# Patient Record
Sex: FEMALE | Race: WHITE | URBAN - METROPOLITAN AREA
[De-identification: names, ages, dates, MRNs, and addresses within clinical notes are randomized per-mention and may not be internally consistent; named-entity substitution may affect disease eponyms.]

---

## 2017-02-13 ENCOUNTER — RESULT FOLLOW UP (OUTPATIENT)
Dept: FAMILY MEDICINE | Facility: CLINIC | Age: 37
End: 2017-02-13

## 2017-02-13 ENCOUNTER — OFFICE VISIT (OUTPATIENT)
Dept: FAMILY MEDICINE | Facility: CLINIC | Age: 37
End: 2017-02-13
Payer: COMMERCIAL

## 2017-02-13 VITALS
WEIGHT: 235.4 LBS | HEIGHT: 69 IN | BODY MASS INDEX: 34.87 KG/M2 | SYSTOLIC BLOOD PRESSURE: 130 MMHG | DIASTOLIC BLOOD PRESSURE: 88 MMHG | TEMPERATURE: 97.7 F | HEART RATE: 88 BPM

## 2017-02-13 DIAGNOSIS — Z00.00 ROUTINE GENERAL MEDICAL EXAMINATION AT A HEALTH CARE FACILITY: Primary | ICD-10-CM

## 2017-02-13 DIAGNOSIS — Z13.1 SCREENING FOR DIABETES MELLITUS: ICD-10-CM

## 2017-02-13 DIAGNOSIS — Z13.29 SCREENING FOR THYROID DISORDER: ICD-10-CM

## 2017-02-13 DIAGNOSIS — Z13.220 SCREENING FOR LIPOID DISORDERS: ICD-10-CM

## 2017-02-13 DIAGNOSIS — R87.810 ASCUS WITH POSITIVE HIGH RISK HPV CERVICAL: ICD-10-CM

## 2017-02-13 DIAGNOSIS — Z11.51 SCREENING FOR HUMAN PAPILLOMAVIRUS: ICD-10-CM

## 2017-02-13 DIAGNOSIS — Z12.4 SCREENING FOR CERVICAL CANCER: ICD-10-CM

## 2017-02-13 DIAGNOSIS — R87.610 ASCUS WITH POSITIVE HIGH RISK HPV CERVICAL: ICD-10-CM

## 2017-02-13 PROCEDURE — 87624 HPV HI-RISK TYP POOLED RSLT: CPT | Performed by: NURSE PRACTITIONER

## 2017-02-13 PROCEDURE — 99395 PREV VISIT EST AGE 18-39: CPT | Performed by: NURSE PRACTITIONER

## 2017-02-13 PROCEDURE — G0124 SCREEN C/V THIN LAYER BY MD: HCPCS | Performed by: NURSE PRACTITIONER

## 2017-02-13 PROCEDURE — G0145 SCR C/V CYTO,THINLAYER,RESCR: HCPCS | Performed by: NURSE PRACTITIONER

## 2017-02-13 NOTE — PATIENT INSTRUCTIONS
Nice to meet you Yue.  Please call [phone] to schedule fasting labs. I will let you know your PAP/ lab results. Follow up if you have any health care questions or concerns. Good luck with your paramedic program.      Preventive Health Recommendations  Female Ages 26 - 39   Yearly exam:   See your health care provider every year in order to    Review health changes.     Discuss preventive care.      Review your medicines if you your doctor has prescribed any.    Until age 30: Get a Pap test every three years (more often if you have had an abnormal result).    After age 30: Talk to your doctor about whether you should have a Pap test every 3 years or have a Pap test with HPV screening every 5 years.   You do not need a Pap test if your uterus was removed (hysterectomy) and you have not had cancer.  You should be tested each year for STDs (sexually transmitted diseases), if you're at risk.   Talk to your provider about how often to have your cholesterol checked.  If you are at risk for diabetes, you should have a diabetes test (fasting glucose).  Shots: Get a flu shot each year. Get a tetanus shot every 10 years.   Nutrition:     Eat at least 5 servings of fruits and vegetables each day.    Eat whole-grain bread, whole-wheat pasta and brown rice instead of white grains and rice.    Talk to your provider about Calcium and Vitamin D.     Lifestyle    Exercise at least 150 minutes a week (30 minutes a day, 5 days of the week). This will help you control your weight and prevent disease.    Limit alcohol to one drink per day.    No smoking.     Wear sunscreen to prevent skin cancer.    See your dentist every six months for an exam and cleaning.

## 2017-02-13 NOTE — PROGRESS NOTES
SUBJECTIVE:          HPI     SUBJECTIVE:     CC: Yue Kat is an 36 year old woman who presents for preventive health visit.     Healthy Habits:    Do you get at least three servings of calcium containing foods daily (dairy, green leafy vegetables, etc.)? yes    Amount of exercise or daily activities, outside of work: 3 day(s) per week    Problems taking medications regularly No    Medication side effects: No    Have you had an eye exam in the past two years? no    Do you see a dentist twice per year? no  Do you have sleep apnea, excessive snoring or daytime drowsiness?no      Today's PHQ-2 Score:   PHQ-2 ( 1999 Pf[employer]) 2/13/2017   Q1: Little interest or pleasure in doing things 0   Q2: Feeling down, depressed or hopeless 0   PHQ-2 Score 0       Abuse: Current or Past(Physical, Sexual or Emotional)- No  Do you feel safe in your environment - YES    Social History   Substance Use Topics     Smoking status: Not on file     Smokeless tobacco: Not on file     Alcohol use Not on file     The patient does not drink >3 drinks per day nor >7 drinks per week.    No results for input(s): CHOL, HDL, LDL, TRIG, CHOLHDLRATIO, NHDL in the last 93865 hours.    Reviewed orders with patient.  Reviewed health maintenance and updated orders accordingly - Yes    Mammo Decision Support:  Mammogram not appropriate for this patient based on age.    Pertinent mammograms are reviewed under the imaging tab.  History of abnormal Pap smear: NO - age 30- 65 PAP every 3 years recommended  All Histories reviewed and updated in Epic.  No past medical history on file.   No past surgical history on file.  Obstetric History     No data available          ROS:  C: NEGATIVE for fever, chills, change in weight  I: NEGATIVE for worrisome rashes, moles or lesions  E: NEGATIVE for vision changes or irritation  ENT: NEGATIVE for ear, mouth and throat problems  R: NEGATIVE for significant cough or SOB  B: NEGATIVE for masses, tenderness or  "discharge  CV: NEGATIVE for chest pain, palpitations or peripheral edema  GI: NEGATIVE for nausea, abdominal pain, heartburn, or change in bowel habits  : NEGATIVE for unusual urinary or vaginal symptoms. Periods are regular.  M: NEGATIVE for significant arthralgias or myalgia  N: NEGATIVE for weakness, dizziness or paresthesias  E: NEGATIVE for temperature intolerance, skin/hair changes  H: NEGATIVE for bleeding problems  P: NEGATIVE for changes in mood or affect    Problem list, Medication list, Allergies, and Medical/Social/Surgical histories reviewed in [facility] and updated as appropriate.  Labs reviewed in EPIC  BP Readings from Last 3 Encounters:   02/13/17 130/88    Wt Readings from Last 3 Encounters:   02/13/17 235 lb 6.4 oz (106.8 kg)                  There is no problem list on file for this patient.    No past surgical history on file.    Social History   Substance Use Topics     Smoking status: Not on file     Smokeless tobacco: Not on file     Alcohol use Not on file     No family history on file.      No current outpatient prescriptions on file.     Allergies   Allergen Reactions     Penicillins      OBJECTIVE:     /88  Pulse 88  Temp 97.7  F (36.5  C) (Oral)  Ht 5' 9\" (1.753 m)  Wt 235 lb 6.4 oz (106.8 kg)  BMI 34.76 kg/m2  EXAM:  GENERAL: healthy, alert and no distress  EYES: Eyes grossly normal to inspection, PERRL and conjunctivae and sclerae normal  HENT: ear canals and TM's normal, nose and mouth without ulcers or lesions  NECK: no adenopathy, no asymmetry, masses, or scars and thyroid normal to palpation  RESP: lungs clear to auscultation - no rales, rhonchi or wheezes  BREAST: normal without masses, tenderness or nipple discharge and no palpable axillary masses or adenopathy  CV: regular rate and rhythm, normal S1 S2, no S3 or S4, no murmur, click or rub, no peripheral edema and peripheral pulses strong  ABDOMEN: soft, nontender, no hepatosplenomegaly, no masses and bowel sounds normal   " "(female): normal female external genitalia, normal urethral meatus, vaginal mucosa pink, moist, well rugated, and normal cervix/adnexa/uterus without masses or discharge  RECTAL: normal sphincter tone  MS: no gross musculoskeletal defects noted, no edema  SKIN: no suspicious lesions or rashes  NEURO: Normal strength and tone, mentation intact and speech normal  PSYCH: mentation appears normal, affect normal/bright  LYMPH: no cervical, supraclavicular, axillary, or inguinal adenopathy    ASSESSMENT/PLAN:         ICD-10-CM    1. Routine general medical examination at a health care facility Z00.00    2. Screening for lipoid disorders Z13.220 Lipid Profile with reflex to direct LDL   3. Screening for diabetes mellitus Z13.1 Glucose   4. Screening for cervical cancer Z12.4 Pap imaged thin layer screen with HPV - recommended age 30 - 65 years (select HPV order below)   5. Screening for human papillomavirus Z11.51 HPV High Risk Types DNA Cervical   6. Screening for thyroid disorder Z13.29 TSH with free T4 reflex       COUNSELING:   Reviewed preventive health counseling, as reflected in patient instructions   Nice to meet you Yue.  Please call [phone] to schedule fasting labs. I will let you know your PAP/ lab results. Follow up if you have any health care questions or concerns. Good luck with your paramedic program.    BP Screening:   Last 3 BP Readings:    BP Readings from Last 3 Encounters:   02/13/17 130/88       The following was recommended to the patient:  Re-screen BP within a year and recommended lifestyle modifications     has no tobacco history on file.    Estimated body mass index is 34.76 kg/(m^2) as calculated from the following:    Height as of this encounter: 5' 9\" (1.753 m).    Weight as of this encounter: 235 lb 6.4 oz (106.8 kg).   Weight management plan: Discussed healthy diet and exercise guidelines and patient will follow up in 12 months in clinic to re-evaluate.    Counseling Resources:  ATP " IV Guidelines  Pooled Cohorts Equation Calculator  Breast Cancer Risk Calculator  FRAX Risk Assessment  ICSI Preventive Guidelines  Dietary Guidelines for Americans, 2010  USDA's MyPlate  ASA Prophylaxis  Lung CA Screening    MARY Townsend  [facility]

## 2017-02-13 NOTE — NURSING NOTE
"Chief Complaint   Patient presents with     Physical       Initial BP (!) 132/91  Pulse 88  Temp 97.7  F (36.5  C) (Oral)  Ht 5' 9\" (1.753 m)  Wt 235 lb 6.4 oz (106.8 kg)  BMI 34.76 kg/m2 Estimated body mass index is 34.76 kg/(m^2) as calculated from the following:    Height as of this encounter: 5' 9\" (1.753 m).    Weight as of this encounter: 235 lb 6.4 oz (106.8 kg).  Medication Reconciliation: complete     Piedad Luong CMA      "

## 2017-02-13 NOTE — LETTER
September 26, 2017      Yue M North  [address] [facility] [address]    Dear ,      At [facility], your health and wellness is our primary concern. That is why we are following up on a LEEP from 4/10/17, which was reported as HSIL. Your provider had recommended that you have a Pap smear completed by 10/10/17. Our records do not show that this has been done.      It is important to complete the follow up that your provider has suggested for you to ensure that there are no worsening changes which may, over time, develop into cancer.      Please contact our office at  [phone] to schedule an appointment for a Pap smear at your earliest convenience. If you have questions or concerns, please call the clinic and we will be happy to assist you.    If you have completed the tests outside of [facility], please have the results forwarded to our office. We will update the chart for your primary Physician to review before your next annual physical.     Thank you for choosing [facility]!    Sincerely,      Yumiko Lopes NP/blake

## 2017-02-13 NOTE — MR AVS SNAPSHOT
After Visit Summary   2/13/2017    Yue Kat    MRN: [id number]           Patient Information     Date Of Birth          1980        Visit Information        Provider Department      2/13/2017 8:20 AM Yumiko Lopes NP [facility]        Today's Diagnoses     Screening for lipoid disorders    -  1    Screening for diabetes mellitus        Screening for cervical cancer        Screening for human papillomavirus        Screening for thyroid disorder          Care Instructions    Nice to meet you Yue.  Please call [phone] to schedule fasting labs. I will let you know your PAP/ lab results. Follow up if you have any health care questions or concerns. Good luck with your paramedic program.      Preventive Health Recommendations  Female Ages 26 - 39  Yearly exam:   See your health care provider every year in order to    Review health changes.     Discuss preventive care.      Review your medicines if you your doctor has prescribed any.    Until age 30: Get a Pap test every three years (more often if you have had an abnormal result).    After age 30: Talk to your doctor about whether you should have a Pap test every 3 years or have a Pap test with HPV screening every 5 years.   You do not need a Pap test if your uterus was removed (hysterectomy) and you have not had cancer.  You should be tested each year for STDs (sexually transmitted diseases), if you're at risk.   Talk to your provider about how often to have your cholesterol checked.  If you are at risk for diabetes, you should have a diabetes test (fasting glucose).  Shots: Get a flu shot each year. Get a tetanus shot every 10 years.   Nutrition:     Eat at least 5 servings of fruits and vegetables each day.    Eat whole-grain bread, whole-wheat pasta and brown rice instead of white grains and rice.    Talk to your provider about Calcium and Vitamin D.     Lifestyle    Exercise at least 150 minutes a week (30 minutes a  "day, 5 days of the week). This will help you control your weight and prevent disease.    Limit alcohol to one drink per day.    No smoking.     Wear sunscreen to prevent skin cancer.    See your dentist every six months for an exam and cleaning.          Follow-ups after your visit        Future tests that were ordered for you today     Open Future Orders        Priority Expected Expires Ordered    TSH with free T4 reflex Routine  2018    Lipid Profile with reflex to direct LDL Routine  2018    Glucose Routine  2018            Who to contact     Normal or non-critical lab and imaging results will be communicated to you by E[employer] D[employer]et[employer], letter or phone within 4 business days after the clinic has received the results. If you do not hear from us within 7 days, please contact the clinic through StoryPresst or phone. If you have a critical or abnormal lab result, we will notify you by phone as soon as possible.  Submit refill requests through [employer] or call your pharmacy and they will forward the refill request to us. Please allow 3 business days for your refill to be completed.          If you need to speak with a  for additional information , please call: [phone]             Additional Information About Your Visit        Kantox Information     Kantox lets you send messages to your doctor, view your test results, renew your prescriptions, schedule appointments and more. To sign up, go to [email or web address] . Click on \"Log in\" on the left side of the screen, which will take you to the Welcome page. Then click on \"Sign up Now\" on the right side of the page.     You will be asked to enter the access code listed below, as well as some personal information. Please follow the directions to create your username and password.     Your access code is: [email or web address]  Expires: 2017  8:46 AM     Your access code will  in 90 days. If you need help or a new " "code, please call your [facility] clinic or [phone].        Care EveryWhere ID     This is your Care EveryWhere ID. This could be used by other organizations to access your [facility] medical records  [id number]        Your Vitals Were     Pulse Temperature Height BMI (Body Mass Index)          88 97.7  F (36.5  C) (Oral) 5' 9\" (1.753 m) 34.76 kg/m2         Blood Pressure from Last 3 Encounters:   02/13/17 (!) 132/91    Weight from Last 3 Encounters:   02/13/17 235 lb 6.4 oz (106.8 kg)              We Performed the Following     HPV High Risk Types DNA Cervical     Pap imaged thin layer screen with HPV - recommended age 30 - 65 years (select HPV order below)        Primary Care Provider Office Phone #    [facility] [phone]       No address on file        Thank you!     Thank you for choosing [facility]  for your care. Our goal is always to provide you with excellent care. Hearing back from our patients is one way we can continue to improve our services. Please take a few minutes to complete the written survey that you may receive in the mail after your visit with us. Thank you!             Your Updated Medication List - Protect others around you: Learn how to safely use, store and throw away your medicines at www.disposemymeds.org.      Notice  As of 2/13/2017  8:46 AM    You have not been prescribed any medications.      "

## 2017-02-16 LAB
COPATH REPORT: ABNORMAL
PAP: ABNORMAL

## 2017-02-17 LAB
FINAL DIAGNOSIS: ABNORMAL
HPV HR 12 DNA CVX QL NAA+PROBE: POSITIVE
HPV16 DNA SPEC QL NAA+PROBE: NEGATIVE
HPV18 DNA SPEC QL NAA+PROBE: NEGATIVE
SPECIMEN DESCRIPTION: ABNORMAL

## 2017-02-20 ENCOUNTER — MYC MEDICAL ADVICE (OUTPATIENT)
Dept: FAMILY MEDICINE | Facility: OTHER | Age: 37
End: 2017-02-20

## 2017-02-20 DIAGNOSIS — R87.610 ATYPICAL SQUAMOUS CELLS OF UNDETERMINED SIGNIFICANCE ON CYTOLOGIC SMEAR OF CERVIX (ASC-US): Primary | ICD-10-CM

## 2017-02-21 PROBLEM — R87.610 ASCUS WITH POSITIVE HIGH RISK HPV CERVICAL: Status: ACTIVE | Noted: 2017-02-13

## 2017-02-21 PROBLEM — R87.810 ASCUS WITH POSITIVE HIGH RISK HPV CERVICAL: Status: ACTIVE | Noted: 2017-02-13

## 2017-02-21 NOTE — PROGRESS NOTES
2/13/17: ASCUS Pap, + HR HPV (Neg 16/18). Plan colp  2/22/17: Message left to return call. (carlos)  2/27/17: [place] Bx & ECC - HSIL. Plan consult with Dr. Agbeh for treatment options.   3/27/17: Consult with GYN. Plan LEEP  4/10/17: LEEP - focal HSIL. Plan pap in 6 months.   9/26/17 My Chart pap reminder message sent (rlm)  10/23/17 Pap reminder telephone message left (rlm)  11/15/17 Consider lost to f/u, routed to provider (rlm)  11/27/17 Patient is considered lost to f/u, acknowledged by provider.  End Tracking (rlm)

## 2017-02-22 ENCOUNTER — TELEPHONE (OUTPATIENT)
Dept: OBGYN | Facility: CLINIC | Age: 37
End: 2017-02-22

## 2017-02-22 NOTE — TELEPHONE ENCOUNTER
Spoke with pt and gave her the below information. Instructed her to make an appt with ob/gyn. She would like to see Dr. Agbeh here at [place], gave her number to call to schedule appt. Pt verbalized understanding and appreciation for the call.   Michelle Carlson RN

## 2017-02-22 NOTE — TELEPHONE ENCOUNTER
Please call pt:      I apologize for the delay.  I have been out of the office ill, I am out today; additionally, we have PAP nurses who address PAP results. Your PAP results are abnormal. The results of the PAP show: Atypical squamous cells with HPV present: not type 16 or 18. Because of this, you will need to be seen by OB/GYN, for probable colposcopy. Order for OB/GYN has been placed. Yumiko Lopes, APRN, FNP-BC

## 2017-02-27 ENCOUNTER — OFFICE VISIT (OUTPATIENT)
Dept: OBGYN | Facility: CLINIC | Age: 37
End: 2017-02-27
Payer: COMMERCIAL

## 2017-02-27 VITALS
WEIGHT: 230 LBS | TEMPERATURE: 98.1 F | BODY MASS INDEX: 33.97 KG/M2 | HEART RATE: 70 BPM | OXYGEN SATURATION: 97 % | DIASTOLIC BLOOD PRESSURE: 86 MMHG | SYSTOLIC BLOOD PRESSURE: 138 MMHG

## 2017-02-27 DIAGNOSIS — R87.810 ASCUS WITH POSITIVE HIGH RISK HPV CERVICAL: Primary | ICD-10-CM

## 2017-02-27 DIAGNOSIS — R87.610 ASCUS WITH POSITIVE HIGH RISK HPV CERVICAL: Primary | ICD-10-CM

## 2017-02-27 PROCEDURE — 57454 BX/CURETT OF CERVIX W/SCOPE: CPT | Performed by: OBSTETRICS & GYNECOLOGY

## 2017-02-27 PROCEDURE — 88341 IMHCHEM/IMCYTCHM EA ADD ANTB: CPT | Performed by: OBSTETRICS & GYNECOLOGY

## 2017-02-27 PROCEDURE — 88342 IMHCHEM/IMCYTCHM 1ST ANTB: CPT | Performed by: OBSTETRICS & GYNECOLOGY

## 2017-02-27 PROCEDURE — 88305 TISSUE EXAM BY PATHOLOGIST: CPT | Performed by: OBSTETRICS & GYNECOLOGY

## 2017-02-27 PROCEDURE — 99242 OFF/OP CONSLTJ NEW/EST SF 20: CPT | Mod: 25 | Performed by: OBSTETRICS & GYNECOLOGY

## 2017-02-27 NOTE — MR AVS SNAPSHOT
After Visit Summary   2/27/2017    Yue Kat    MRN: [id number]           Patient Information     Date Of Birth          1980        Visit Information        Provider Department      2/27/2017 1:00 PM Agbeh, Cephas Mawuena, MD; [facility]        Today's Diagnoses     ASCUS with positive high risk HPV cervical    -  1       Follow-ups after your visit        Who to contact     If you have questions or need follow up information about today's clinic visit or your schedule please contact [facility] directly at [phone].  Normal or non-critical lab and imaging results will be communicated to you by Disqushart, letter or phone within 4 business days after the clinic has received the results. If you do not hear from us within 7 days, please contact the clinic through Disqushart or phone. If you have a critical or abnormal lab result, we will notify you by phone as soon as possible.  Submit refill requests through WALTOP or call your pharmacy and they will forward the refill request to us. Please allow 3 business days for your refill to be completed.          Additional Information About Your Visit        MyChart Information     [employer] gives you secure access to your electronic health record. If you see a primary care provider, you can also send messages to your care team and make appointments. If you have questions, please call your primary care clinic.  If you do not have a primary care provider, please call [phone] and they will assist you.        Care EveryWhere ID     This is your Care EveryWhere ID. This could be used by other organizations to access your [place] medical records  [id number]        Your Vitals Were     Pulse Temperature Last Period Pulse Oximetry BMI (Body Mass Index)       70 98.1  F (36.7  C) (Tympanic) 01/27/2017 (Approximate) 97% 33.97 kg/m2        Blood Pressure from Last 3 Encounters:   02/27/17 138/86   02/13/17  130/88    Weight from Last 3 Encounters:   02/27/17 230 lb (104.3 kg)   02/13/17 235 lb 6.4 oz (106.8 kg)              We Performed the Following     COLP CERVIX/UPPER VAGINA W BX CERVIX/ENDOCERV CURETT     Surgical pathology exam        Primary Care Provider Office Phone #    [facility] [phone]       No address on file        Thank you!     Thank you for choosing [facility]  for your care. Our goal is always to provide you with excellent care. Hearing back from our patients is one way we can continue to improve our services. Please take a few minutes to complete the written survey that you may receive in the mail after your visit with us. Thank you!             Your Updated Medication List - Protect others around you: Learn how to safely use, store and throw away your medicines at www.disposemymeds.org.      Notice  As of 2/27/2017  1:47 PM    You have not been prescribed any medications.

## 2017-02-27 NOTE — PROGRESS NOTES
Patient Name: Paco Kat              Date: 2/27/2017   YOB: 1980                         Age: 37 year old   Phone: [phone] (home)   ________________________________________________________________________  I have been asked to see Paco in consultation by TIMOTHY JACKSON  to discuss the pap smear, findings and possible further evaluation.  H/O abnormal pap 5 years ago and never followed up. The patient's pap smear on   Results for orders placed or performed in visit on 02/13/17   Pap imaged thin layer screen with HPV - recommended age 30 - 65 years (select HPV order below)   Result Value Ref Range    PAP ASC-US (A)     Copath Report         Patient Name: PACO KAT  MR#: [id number]  Specimen #: [id number]  Collected: 2/13/2017  Received: 2/14/2017  Reported: 2/16/2017 14:10  Ordering Phy(s): TIMOTHY JACKSON    For improved result formatting, select 'View Enhanced Report Format'  under Linked Documents section.    SPECIMEN/STAIN PROCESS:  Pap imaged thin layer prep screening (Surepath, FocalPoint with guided  screening)       Pap-Cyto x 1, HPV ordered x 1    SOURCE: Cervical, endocervical  ----------------------------------------------------------------   Pap imaged thin layer prep screening (Surepath, FocalPoint with guided  screening)  SPECIMEN ADEQUACY:  Satisfactory for evaluation.  -Transformation zone component present.    CYTOLOGIC INTERPRETATION:    Epithelial Cell Abnormality:  Squamous Cell:  Atypical squamous cells-of  undetermined significance (ASC-US).    Electronically signed out by:    Meli Griffith M.D.    Processed and screened at [facility]    CLINICAL HISTORY:    Papanicolaou Test Limitations:  Cervical cytology is a screening test  with limited sensitivity; regular screening is critical for cancer  prevention; Pap tests are primarily effective for the  diagnosis/prevention of squamous cell carcinoma, not  adenocarcinomas or  other cancers.    TESTING LAB LOCATION:  [facility], [address]  [address] [place] [address]  [phone]    COLLECTION SITE:  Client:  [facility], [place]  Location: [facility] (B)     HPV High Risk Types DNA Cervical   Result Value Ref Range    HPV 16 DNA Negative NEG    HPV 18 DNA Negative NEG    Other HR HPV Positive (A) NEG    Final Diagnosis       This patient's sample is positive for other HR HPV DNA (types 31, 33, 35, 39, 45,   51, 52, 56, 58, 59, 66 or 68), not HPV 16 or HPV 18 DNA. This result requires   clinical correlation with concurrent cytology findings.  (Note)  METHODOLOGY:  The Roche lin 4800 system uses automated extraction,  simultaneous amplification of HPV (L1 region) and beta-globin,  followed by  real time detection of fluorescent labeled HPV and beta  globin using specific oligonucleotide probes . The test specifically  identifies types HPV 16 DNA and HPV 18 DNA while concurrently  detecting the rest of the high risk types (31, 33, 35, 39, 45, 51,  52, 56, 58, 59, 66 or 68).    COMMENTS:  This test is not intended for use as a screening device  for women under age 30 with normal cervical cytology.  Results should  be correlated with cytologic and histologic findings. Close clinical  followup is recommended.    This test was developed and its performance characteristics  determined by the [facility]. It has not been cleared or approved by the  FDA. The laboratory is regulated under CLIA as qualified to perform  high-complexity testing. This test is used for clinical purposes. It  should not be regarded as investigational or for research.        Specimen Description Cervical Cells  [facility] [place] [address]      I attempted to ensure that the patient was educated regarding the nature of her findings and implications to date.  We reviewed the role of HPV, incidence in the  population and the natural history of the infection, and its transmission.  We also reviewed ways to minimize her future risk, the effect of HPV on the cervix and treatment options available, should they be indicated.    The pathophysiology of the cervix, including a discussion of the squamous and columnar cells, metaplasia and dysplasia have been reviewed, drawings, sketches and the pamphlets were reviewed with her.      Patient's last menstrual period was 01/27/2017 (approximate).      Past Medical History   Diagnosis Date     ASCUS with positive high risk HPV cervical 02/13/2017     ASCUS Pap, + HR HPV       No past surgical history on file.     No outpatient encounter prescriptions on file as of 2/27/2017.     No facility-administered encounter medications on file as of 2/27/2017.         Allergies as of 02/27/2017 - Zach as Reviewed 02/27/2017   Allergen Reaction Noted     Penicillins  02/13/2017       Social History     Social History     Marital status: Single     Spouse name: N/A     Number of children: N/A     Years of education: N/A     Social History Main Topics     Smoking status: Former Smoker     Smokeless tobacco: None     Alcohol use None     Drug use: None     Sexual activity: Not Asked     Other Topics Concern     None     Social History Narrative        No family history on file.      Review Of Systems  Skin: negative  Eyes: negative  Ears/Nose/Throat: negative  Respiratory: No shortness of breath, dyspnea on exertion, cough, or hemoptysis and negative  Cardiovascular: negative  Gastrointestinal: negative  Genitourinary: negative  Musculoskeletal: negative  Neurologic: negative  Psychiatric: negative  Hematologic/Lymphatic/Immunologic: negative  Endocrine: negative     Exam:   /86  Pulse 70  Temp 98.1  F (36.7  C) (Tympanic)  Wt 230 lb (104.3 kg)  LMP 01/27/2017 (Approximate)  SpO2 97%  BMI 33.97 kg/m2  GENERAL:  WNWD female NAD  HEENT: NC/AT, EOMI  SKIN: normal skin turgor  GAIT:  Normal  NECK: Symmetrical, no masses noted   VULVA: Normal Genitalia  BUS: Normal  URETHRA:  No hypermobility noted  URETHRAL MEATUS:  No masses noted  VAGINA: Normal mucosa, no discharge  CERVIX: Closed, mobile, no discharge  PERIANAL:  No masses or lesions seen  EXTREMITIES: no clubbing, cyanosis, or edema    Assessment:  ASCUS, Other High Risk HPV ( not 16 or 18)    Plan:  Recommend to Proceed with Colpo  The details of the colposcopic procedure were reviewed, the risks of missed diagnoses, pain, infection, and bleeding.    TT 30 min, in addition to the time for the procedure  CT greater than 50%, as noted above in the HPI and in the Plan.     CEPHAS AGBEH, MD.          Procedure:  Procedure for colposcopy and biopsy has been explained to the patient and consent obtained.    Before the procedure, it was ensured that the patient was educated regarding the nature of her findings and implications to date.  We reviewed the role of HPV and the natural history of the infection.  We also reviewed ways to minimize her future risk, the effect of HPV on the cervix and treatment options available, should they be indicated.    The pathophysiology of the cervix, including a discussion of the squamous and columnar cells, metaplasia and dysplasia have been reviewed, drawings, sketches and the pamphlets were reviewed with her.  The details of the colposcopic procedure were reviewed, the risks of missed diagnoses, pain, infection, and bleeding.  Questions seemed to be answered before proceeding and the patient then consented to the procedure.     Speculum placed in vagina and excellent visualization of cervix achieved, cervix swabbed  with acetic acid solution.    FINDINGS:  Cervix: visible lesion(s) at 9,11,12 o'clock and cervix swabbed with Lugol's solution, endocervical curettage performed, cervical biopsies taken at 9,11,12 o'clock, specimen labelled and sent to pathology and hemostasis achieved with Monsel's  solution.      Procedure Summary: Patient tolerated procedure well and colposcopy adequate.    Colposcopic Impression: HPV/CIN1    Plan: Specimens labelled and sent to pathology., Will base further treatment on pathology findings., treatment options discussed with patient, post biopsy instructions given to patient and call to discuss Pathology results.        ICD-10-CM    1. ASCUS with positive high risk HPV cervical R87.610 Surgical pathology exam    R87.810 COLP CERVIX/UPPER VAGINA W BX CERVIX/ENDOCERV CURETT     ACOG pamphlet was provided on the above topics.    CEPHAS AGBEH, MD.

## 2017-03-06 ENCOUNTER — MYC MEDICAL ADVICE (OUTPATIENT)
Dept: OBGYN | Facility: CLINIC | Age: 37
End: 2017-03-06

## 2017-03-08 ENCOUNTER — MYC MEDICAL ADVICE (OUTPATIENT)
Dept: OBGYN | Facility: CLINIC | Age: 37
End: 2017-03-08

## 2017-03-08 ENCOUNTER — TRANSFERRED RECORDS (OUTPATIENT)
Dept: HEALTH INFORMATION MANAGEMENT | Facility: CLINIC | Age: 37
End: 2017-03-08

## 2017-03-08 NOTE — TELEPHONE ENCOUNTER
"Spoke with  Lab- they checked with the [facility].  The pathology was in the review of Dr. Kwon at this time.  Lab called back and states that pathology had some \"unusual cells\" that were unidentifiable and the specimen was sent out to the [facility].  Per the lab- results should be back late this week or early next week.  Dr. Kwon's phone number- [phone] (case ID- [id number]) if Dr. Agbeh would like to speak with Dr. Kwon regarding this.  Will forward to provider to review.  "

## 2017-03-09 NOTE — TELEPHONE ENCOUNTER
Return call to patient- explained Pharminoxhart message below to patient.  Reassured patient that this does not mean the results are cancer.  The results are being sent for further review.  Patient nervous about waiting.  Patient has a ski trip planned and is leaving on 3/16/17 and is hoping to have results by then.  Reassured patient we will notify her once the results are in.  Patient understood.

## 2017-03-17 LAB — COPATH REPORT: NORMAL

## 2017-03-27 ENCOUNTER — OFFICE VISIT (OUTPATIENT)
Dept: OBGYN | Facility: CLINIC | Age: 37
End: 2017-03-27
Payer: COMMERCIAL

## 2017-03-27 ENCOUNTER — TELEPHONE (OUTPATIENT)
Dept: OBGYN | Facility: CLINIC | Age: 37
End: 2017-03-27

## 2017-03-27 VITALS — DIASTOLIC BLOOD PRESSURE: 85 MMHG | SYSTOLIC BLOOD PRESSURE: 147 MMHG | HEART RATE: 74 BPM | OXYGEN SATURATION: 98 %

## 2017-03-27 DIAGNOSIS — R87.623 HGSIL ON CYTOLOGIC SMEAR OF VAGINA: Primary | ICD-10-CM

## 2017-03-27 PROCEDURE — 99214 OFFICE O/P EST MOD 30 MIN: CPT | Performed by: OBSTETRICS & GYNECOLOGY

## 2017-03-27 NOTE — MR AVS SNAPSHOT
After Visit Summary   3/27/2017    Yue Kat    MRN: [id number]           Patient Information     Date Of Birth          1980        Visit Information        Provider Department      3/27/2017 10:30 AM Agbeh, Cephas Mawuena, MD [facility]        Today's Diagnoses     HGSIL on cytologic smear of vagina    -  1       Follow-ups after your visit        Your next 10 appointments already scheduled     Mar 31, 2017 11:15 AM CDT   Office Visit with Cephas Mawuena Agbeh, MD   [facility] ([facility])    [address] [facility] [address] [place] [id number] [phone]           Bring a current list of meds and any records pertaining to this visit.  For Physicals, please bring immunization records and any forms needing to be filled out.  Please arrive 10 minutes early to complete paperwork.              Who to contact     If you have questions or need follow up information about today's clinic visit or your schedule please contact [facility] directly at [phone].  Normal or non-critical lab and imaging results will be communicated to you by CleanScapeshart, letter or phone within 4 business days after the clinic has received the results. If you do not hear from us within 7 days, please contact the clinic through Ageto Servicet or phone. If you have a critical or abnormal lab result, we will notify you by phone as soon as possible.  Submit refill requests through Credii or call your pharmacy and they will forward the refill request to us. Please allow 3 business days for your refill to be completed.          Additional Information About Your Visit        CleanScapeshart Information     Credii gives you secure access to your electronic health record. If you see a primary care provider, you can also send messages to your care team and make appointments. If you have questions, please call your primary care clinic.  If you do not have a primary care provider, please  call [phone] and they will assist you.        Care EveryWhere ID     This is your Care EveryWhere ID. This could be used by other organizations to access your [place] medical records  [id number]        Your Vitals Were     Pulse Last Period Pulse Oximetry             74 02/27/2017 (Approximate) 98%          Blood Pressure from Last 3 Encounters:   03/27/17 147/85   02/27/17 138/86   02/13/17 130/88    Weight from Last 3 Encounters:   02/27/17 230 lb (104.3 kg)   02/13/17 235 lb 6.4 oz (106.8 kg)              Today, you had the following     No orders found for display       Primary Care Provider Office Phone #    Rosa Elena Soria [facility] [phone]       No address on file        Thank you!     Thank you for choosing [facility]  for your care. Our goal is always to provide you with excellent care. Hearing back from our patients is one way we can continue to improve our services. Please take a few minutes to complete the written survey that you may receive in the mail after your visit with us. Thank you!             Your Updated Medication List - Protect others around you: Learn how to safely use, store and throw away your medicines at www.disposemymeds.org.      Notice  As of 3/27/2017 12:08 PM    You have not been prescribed any medications.

## 2017-03-27 NOTE — TELEPHONE ENCOUNTER
Pt has leep procedure scheduled for Friday, patient would like to know if this procedure can happen if she has her mensus?  Okay to leave message.

## 2017-03-27 NOTE — NURSING NOTE
"Chief Complaint   Patient presents with     Gyn Exam     results       Initial /85  Pulse 74  LMP 02/27/2017 (Approximate)  SpO2 98% Estimated body mass index is 33.97 kg/(m^2) as calculated from the following:    Height as of 2/13/17: 5' 9\" (1.753 m).    Weight as of 2/27/17: 230 lb (104.3 kg).  Medication Reconciliation: unable or not appropriate to perform     Gracie Briceno LPN    "

## 2017-03-27 NOTE — PROGRESS NOTES
Yue is a 37 year old  referred here  regarding colposcopy/biopsy results as steph.  SPECIMEN(S):   A: Endocervical curettings   B: Cervical biopsy, 9, 11, 12 o'clock     FINAL DIAGNOSIS:   A. Endocervical curettings:   - High grade intraepithelial lesion (see comment)     B. Cervical biopsy, 9, 11, 12 o'clock:   - High grade intraepithelial lesion (see comment)       ROS: Ten point review of systems was reviewed and negative except the above.        Past Medical History:   Diagnosis Date     ASCUS with positive high risk HPV cervical 2017    ASCUS Pap, + HR HPV     H/O colposcopy with cervical biopsy 2017    Bx & ECC - HSIL     No past surgical history on file.  Patient Active Problem List   Diagnosis     ASCUS with positive high risk HPV cervical     HGSIL on cytologic smear of vagina       ALL/Meds: Her medication and allergy histories were reviewed and are documented in their appropriate chart areas.    SH: - tob, - EtOH,     FH: Her family history was reviewed and documented in its appropriate chart area.    PE: /85  Pulse 74  LMP 2017 (Approximate)  SpO2 98%  There is no height or weight on file to calculate BMI.      General:  WNWD female, NAD  Alert  Oriented x 3  Gait:  Normal  Skin:  Normal skin turgor  HEENT:  NC/AT, EOMI  Abdomen:  Non-tender, non-distended.  Pelvic exam:  Not performed  Extremities:  No clubbing, no cyanosis and no edema.      A/P    ICD-10-CM    1. HGSIL on cytologic smear of vagina R87.623       I discussed per biopsy results and the diagnosis of HGSIL. I recommended LEEP procedure.  With the aid of the ACOG pamphlets , iI discussed the RBA of the procedure.  She is sched for leep this week.    25 minutes was spent face to face with the patient today discussing her history, diagnosis, and follow-up plan as noted above.  Over 50% of the visit was spent in counseling and coordination of care.    Total Visit Time: 30 minutes.    CEPHAS AGBEH, MD.

## 2017-03-27 NOTE — TELEPHONE ENCOUNTER
Called and spoke with pt, about the Leep. Pt does not have her mensus as of yet, I informed her that if she were to get it and it is heavy she would need to reschedule, but if it is light she should keep her appointment. Pt understood and will wait and see what happens.  Thao Carl, CMA

## 2017-03-29 ENCOUNTER — TELEPHONE (OUTPATIENT)
Dept: OBGYN | Facility: CLINIC | Age: 37
End: 2017-03-29

## 2017-03-29 NOTE — TELEPHONE ENCOUNTER
Patient called back and her leep was scheduled, Pt was informed to take 400MG IBU, and no intercourse 2 weeks prior to procedure. Pt had no questions.  Thao Carl CMA

## 2017-04-10 ENCOUNTER — OFFICE VISIT (OUTPATIENT)
Dept: OBGYN | Facility: CLINIC | Age: 37
End: 2017-04-10
Payer: COMMERCIAL

## 2017-04-10 VITALS
TEMPERATURE: 97.7 F | DIASTOLIC BLOOD PRESSURE: 88 MMHG | SYSTOLIC BLOOD PRESSURE: 152 MMHG | HEART RATE: 65 BPM | OXYGEN SATURATION: 100 %

## 2017-04-10 DIAGNOSIS — R87.810 ASCUS WITH POSITIVE HIGH RISK HPV CERVICAL: Primary | ICD-10-CM

## 2017-04-10 DIAGNOSIS — R87.610 ASCUS WITH POSITIVE HIGH RISK HPV CERVICAL: Primary | ICD-10-CM

## 2017-04-10 PROCEDURE — 88341 IMHCHEM/IMCYTCHM EA ADD ANTB: CPT | Performed by: OBSTETRICS & GYNECOLOGY

## 2017-04-10 PROCEDURE — 88342 IMHCHEM/IMCYTCHM 1ST ANTB: CPT | Performed by: OBSTETRICS & GYNECOLOGY

## 2017-04-10 PROCEDURE — 57522 CONIZATION OF CERVIX: CPT | Performed by: OBSTETRICS & GYNECOLOGY

## 2017-04-10 PROCEDURE — 88307 TISSUE EXAM BY PATHOLOGIST: CPT | Performed by: OBSTETRICS & GYNECOLOGY

## 2017-04-10 PROCEDURE — 88313 SPECIAL STAINS GROUP 2: CPT | Performed by: OBSTETRICS & GYNECOLOGY

## 2017-04-10 NOTE — PROGRESS NOTES
Paco Kat is a 37 year old   who presents today  for LEEP (Loop Electrical Excision Procedure) due to   Copath Report 2017  1:36 PM 88   Patient Name: PACO KAT   MR#: [id number]   Specimen #: [id number]   Collected: 2017   Received: 2017   Reported: 3/17/2017 14:27   Ordering Phy(s): CEPHAS MAWUENA AGBEH     For improved result formatting, select 'View Enhanced Report Format'   under Linked Documents section.     SPECIMEN(S):   A: Endocervical curettings   B: Cervical biopsy, 9, 11, 12 o'clock     FINAL DIAGNOSIS:   A. Endocervical curettings:   - High grade intraepithelial lesion (see comment)     B. Cervical biopsy, 9, 11, 12 o'clock:   - High grade intraepithelial lesion (see comment)         The LEEP procedure was explained. Discussed possible risks including cervical incompetence, infection, bleeding, discomfort, and possible incomplete excision of the abnormal tissue so close follow up was necessary. Consent was obtained and all questions were answered.        Procedure: Patient was placed in the lithotomy position and the grounding pad was placed on her leg. A coated speculum was used to position the cervix in the midline position.   Using 1% lidocaine with epinephrine, intracervical injections were performed.  A total of 10 cc was used.  After adequate anesthesia, a 20x15 mm loop electrode was used to excise the abnormal portion of the cervix.  An ECC was not performed after excision of the transformation zone.  The 5 mm ball cautery was used to fulgurate the cut surface. With bleeding  well controlled, Monsels solution was applied to ensure hemostasis. The specimen(s) was/were labeled  .  The patient tolerated the procedure well.    A:     ICD-10-CM    1. HGSIL on Colposcopy Biopsy & ECC R87.610 Surgical pathology exam    R87.810 COLP CERVIX/UPPER VAGINA W LOOP ELEC BX CERVIX         P:   Post-LEEP instructions were given to the patient.  She was told to expect heavy  discharge for the first 4-7 days and could continue for 2 weeks. Nothing in the vagina and no intercourse for 4-6 weeks.  No heavy lifting for next few days.  Return to clinic in 2 weeks for cervical check and to make sure there is no cervical stenosis. Return sooner if any signs of infection.  Follow up for pap smear in 6 months, then pap and HPV in 12 months.    CEPHAS AGBEH, MD.

## 2017-04-10 NOTE — MR AVS SNAPSHOT
After Visit Summary   4/10/2017    Yue Kat    MRN: [id number]           Patient Information     Date Of Birth          1980        Visit Information        Provider Department      4/10/2017 1:15 PM Agbeh, Cephas Mawuena, MD; [facility]        Today's Diagnoses     HGSIL on Colposcopy Biopsy & ECC    -  1       Follow-ups after your visit        Your next 10 appointments already scheduled     Apr 24, 2017  1:00 PM CDT   Office Visit with [facility]phas [facility]uena [facility]    [address] [address] [phone]           Bring a current list of meds and any records pertaining to this visit.  For Physicals, please bring immunization records and any forms needing to be filled out.  Please arrive 10 minutes early to complete paperwork.              Who to contact     If you have questions or need follow up information about today's clinic visit or your schedule please contact [facility] directly at [phone].  Normal or non-critical lab and imaging results will be communicated to you by SOHMhart, letter or phone within 4 business days after the clinic has received the results. If you do not hear from us within 7 days, please contact the clinic through Foldaxt or phone. If you have a critical or abnormal lab result, we will notify you by phone as soon as possible.  Submit refill requests through AnyPresence or call your pharmacy and they will forward the refill request to us. Please allow 3 business days for your refill to be completed.          Additional Information About Your Visit        [email or web address] Information     AnyPresence gives you secure access to your electronic health record. If you see a primary care provider, you can also send messages to your care team and make appointments. If you have questions, please call your primary care clinic.  If you do not have a primary care  provider, please call [phone] and they will assist you.        Care EveryWhere ID     This is your Care EveryWhere ID. This could be used by other organizations to access your [place] medical records  [id number]        Your Vitals Were     Pulse Temperature Last Period Pulse Oximetry          65 97.7  F (36.5  C) (Tympanic) 03/27/2017 100%         Blood Pressure from Last 3 Encounters:   04/10/17 152/88   03/27/17 147/85   02/27/17 138/86    Weight from Last 3 Encounters:   02/27/17 230 lb (104.3 kg)   02/13/17 235 lb 6.4 oz (106.8 kg)              We Performed the Following     COLP CERVIX/UPPER VAGINA W LOOP ELEC BX CERVIX     Surgical pathology exam        Primary Care Provider Office Phone #    Rosa Elena Soria [facility] [phone]       No address on file        Thank you!     Thank you for choosing [facility]  for your care. Our goal is always to provide you with excellent care. Hearing back from our patients is one way we can continue to improve our services. Please take a few minutes to complete the written survey that you may receive in the mail after your visit with us. Thank you!             Your Updated Medication List - Protect others around you: Learn how to safely use, store and throw away your medicines at www.disposemymeds.org.      Notice  As of 4/10/2017  1:57 PM    You have not been prescribed any medications.

## 2017-04-10 NOTE — NURSING NOTE
"Chief Complaint   Patient presents with     Leep       Initial /88 (BP Location: Right arm, Patient Position: Chair, Cuff Size: Adult Large)  Pulse 65  Temp 97.7  F (36.5  C) (Tympanic)  LMP 03/27/2017  SpO2 100% Estimated body mass index is 33.97 kg/(m^2) as calculated from the following:    Height as of 2/13/17: 5' 9\" (1.753 m).    Weight as of 2/27/17: 230 lb (104.3 kg).  Medication Reconciliation: complete     Gracie Briceno LPN      Pt states she has not had intercourse since her period began on 3/27/17. She declines a pregnancy test.  Gracie Briceno LPN   "

## 2017-04-14 LAB — COPATH REPORT: NORMAL

## 2017-04-24 ENCOUNTER — OFFICE VISIT (OUTPATIENT)
Dept: OBGYN | Facility: CLINIC | Age: 37
End: 2017-04-24
Payer: COMMERCIAL

## 2017-04-24 VITALS
TEMPERATURE: 97.7 F | SYSTOLIC BLOOD PRESSURE: 131 MMHG | HEART RATE: 83 BPM | WEIGHT: 230 LBS | OXYGEN SATURATION: 97 % | DIASTOLIC BLOOD PRESSURE: 88 MMHG | BODY MASS INDEX: 33.97 KG/M2

## 2017-04-24 DIAGNOSIS — R87.810 ASCUS WITH POSITIVE HIGH RISK HPV CERVICAL: Primary | ICD-10-CM

## 2017-04-24 DIAGNOSIS — R87.610 ASCUS WITH POSITIVE HIGH RISK HPV CERVICAL: Primary | ICD-10-CM

## 2017-04-24 PROCEDURE — 99024 POSTOP FOLLOW-UP VISIT: CPT | Performed by: OBSTETRICS & GYNECOLOGY

## 2017-04-24 NOTE — PROGRESS NOTES
Yue is a 37 year old  here for follow up of LEEP 2 weeks ago.   Going to [place] for a wedding. Wondering if can get int the pool and hot tub.    ..FINAL DIAGNOSIS:   Cervix, LEEP biopsy:   - Focal HSIL with focal mucinous differentiation, consistent with SMILE   (stratified mucin producing intraepithelial lesion), and with   indeterminate margin. See comment.   - Focal reactive change in glandular epithelium.   - Focal moderate chronic cervicitis.       ROS: Ten point review of systems was reviewed and negative except the above.    Gyne: +abn pap (last pap ), - STD's    PMH: Her past medical, surgical, and obstetric histories were reviewed and are documented in their appropriate chart areas.    ALL/Meds: Her medication and allergy histories were reviewed and are documented in their appropriate chart areas.    SH: - tob, - EtOH, single    PE: /88  Pulse 83  Temp 97.7  F (36.5  C) (Tympanic)  Wt 230 lb (104.3 kg)  LMP 2017  SpO2 97%  BMI 33.97 kg/m2     General:  WNWD female, NAD  Alert  Oriented x 3  Gait:  Normal  Skin:  Normal skin turgor  HEENT:  NC/AT, EOMI  Abdomen:  Non-tender, non-distended.  Vulva: No external lesions, normal hair distribution, no adenopathy  BUS:  Normal, no masses noted  Urethra:  No hypermobility seen  Urethral meatus:  No masses noted  Vagina: Moist, pink, no abnormal discharge, well rugated, no lesions  Cervix: LEEP HEALED WEE>  Uterus: Normal size, anteverted, non-tender, mobile  Ovaries: No mass, non-tender, mobile  Perianal:  No masses noted  Extremities:  No clubbing, no cyanosis and no edema.    A/P    ICD-10-CM    1. HGSIL on Colposcopy Biopsy & ECC R87.610     R87.810      Discussed leep result.  Repeat pap in 6 months.  20 minutes was spent face to face with the patient today discussing her history, diagnosis, and follow-up plan as noted above.  Over 50% of the visit was spent in counseling and coordination of care.    Total Visit Time: 25 minutes.

## 2017-04-24 NOTE — MR AVS SNAPSHOT
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  Normal or non-critical lab and imaging results will be communicated to you by GenArtshart, letter or phone within 4 business days after the clinic has received the results. If you do not hear from us within 7 days, please contact the clinic through Carenat or phone. If you have a critical or abnormal lab result, we will notify you by phone as soon as possible.  Submit refill requests through Huixiaoer or call your pharmacy and they will forward the refill request to us. Please allow 3 business days for your refill to be completed.          Additional Information About Your Visit        MyChart Information     Huix[employer]er gives you secure access to your electronic health record. If you see a primary care provider, you can also send messages to your care team and make appointments. If you have questions, please call your primary care clinic.  If you do not have a primary care provider, please call [phone] and they will assist you.        Care EveryWhere ID     This is your Care EveryWhere ID. This could be used by other organizations to access your [facility] medical records  [id number]        Your Vitals Were     Pulse Temperature Last Period Pulse Oximetry BMI (Body Mass Index)       83 97.7  F (36.5  C) (Tympanic) 03/27/2017 97% 33.97 kg/m2        Blood Pressure from Last 3 Encounters:   04/24/17 131/88   04/10/17 152/88   03/27/17 147/85    Weight from Last  3 Encounters:   04/24/17 230 lb (104.3 kg)   02/27/17 230 lb (104.3 kg)   02/13/17 235 lb 6.4 oz (106.8 kg)              Today, you had the following     No orders found for display       Primary Care Provider Office Phone #    [facility] [phone]       No address on file        Thank you!     Thank you for choosing [facility]  for your care. Our goal is always to provide you with excellent care. Hearing back from our patients is one way we can continue to improve our services. Please take a few minutes to complete the written survey that you may receive in the mail after your visit with us. Thank you!             Your Updated Medication List - Protect others around you: Learn how to safely use, store and throw away your medicines at www.disposemymeds.org.      Notice  As of 4/24/2017  1:22 PM    You have not been prescribed any medications.

## 2017-04-24 NOTE — NURSING NOTE
"Chief Complaint   Patient presents with     Gyn Exam     recheck LEEP       Initial /88  Pulse 83  Temp 97.7  F (36.5  C) (Tympanic)  Wt 230 lb (104.3 kg)  LMP 03/27/2017  SpO2 97%  BMI 33.97 kg/m2 Estimated body mass index is 33.97 kg/(m^2) as calculated from the following:    Height as of 2/13/17: 5' 9\" (1.753 m).    Weight as of this encounter: 230 lb (104.3 kg).  Medication Reconciliation: complete     Gracie Briceno LPN    "

## 2017-10-23 ENCOUNTER — TELEPHONE (OUTPATIENT)
Dept: OBGYN | Facility: CLINIC | Age: 37
End: 2017-10-23

## 2017-10-23 NOTE — TELEPHONE ENCOUNTER
Pt is past due for fu pap smear  Reminder letter was sent 9/26/17  LMTC and schedule at [facility]  Left this writers number in case of questions  If no reply and/or appt within two weeks (11/6/17) patient will be considered lost to pap tracking f/u.  Keya Pena,   Pap Tracking

## 2018-01-21 ENCOUNTER — HEALTH MAINTENANCE LETTER (OUTPATIENT)
Age: 38
End: 2018-01-21

## 2018-07-24 ENCOUNTER — HEALTH MAINTENANCE LETTER (OUTPATIENT)
Age: 38
End: 2018-07-24

## 2019-02-15 ENCOUNTER — HEALTH MAINTENANCE LETTER (OUTPATIENT)
Age: 39
End: 2019-02-15

## 2020-03-10 ENCOUNTER — HEALTH MAINTENANCE LETTER (OUTPATIENT)
Age: 40
End: 2020-03-10

## 2020-12-27 ENCOUNTER — HEALTH MAINTENANCE LETTER (OUTPATIENT)
Age: 40
End: 2020-12-27

## 2021-04-24 ENCOUNTER — HEALTH MAINTENANCE LETTER (OUTPATIENT)
Age: 41
End: 2021-04-24

## 2021-10-09 ENCOUNTER — HEALTH MAINTENANCE LETTER (OUTPATIENT)
Age: 41
End: 2021-10-09

## 2022-05-21 ENCOUNTER — HEALTH MAINTENANCE LETTER (OUTPATIENT)
Age: 42
End: 2022-05-21

## 2022-07-10 ENCOUNTER — HEALTH MAINTENANCE LETTER (OUTPATIENT)
Age: 42
End: 2022-07-10

## 2022-09-11 ENCOUNTER — HEALTH MAINTENANCE LETTER (OUTPATIENT)
Age: 42
End: 2022-09-11

## 2023-07-29 ENCOUNTER — HEALTH MAINTENANCE LETTER (OUTPATIENT)
Age: 43
End: 2023-07-29

## 2024-02-24 ENCOUNTER — HEALTH MAINTENANCE LETTER (OUTPATIENT)
Age: 44
End: 2024-02-24